# Patient Record
(demographics unavailable — no encounter records)

---

## 2025-04-03 NOTE — HISTORY OF PRESENT ILLNESS
[TextBox_4] : 62 yo F with PMHx of Left Breast Cancer SP Mastectomy 2003, HTN on Losartan, GERD on tums prn, was diagnosed with VELIA in 2017 at [facility], was on CPAP 4cm H2O as per patient. Patient has been noncompliant with device in the past month secondary to device causing symptoms of sneezing, productive phlegm and sore throat, last used 1 month ago and prior to that intermittently.   Employed as   Lives with niece Never smoker [Obstructive Sleep Apnea] : obstructive sleep apnea [Awakes with Dry Mouth] : awakes with dry mouth

## 2025-04-03 NOTE — REVIEW OF SYSTEMS
[Recent Wt Loss (___ Lbs)] : ~T recent [unfilled] lb weight loss [Sputum] : sputum [A.M. Dry Mouth] : a.m. dry mouth [Negative] : Endocrine [TextBox_14] : sneezing